# Patient Record
Sex: FEMALE | Race: WHITE | HISPANIC OR LATINO | URBAN - METROPOLITAN AREA
[De-identification: names, ages, dates, MRNs, and addresses within clinical notes are randomized per-mention and may not be internally consistent; named-entity substitution may affect disease eponyms.]

---

## 2017-02-27 PROBLEM — Z00.00 ENCOUNTER FOR PREVENTIVE HEALTH EXAMINATION: Status: ACTIVE | Noted: 2017-02-27

## 2017-03-03 ENCOUNTER — OUTPATIENT (OUTPATIENT)
Dept: OUTPATIENT SERVICES | Facility: HOSPITAL | Age: 47
LOS: 1 days | End: 2017-03-03
Payer: COMMERCIAL

## 2017-03-03 ENCOUNTER — APPOINTMENT (OUTPATIENT)
Dept: MRI IMAGING | Facility: CLINIC | Age: 47
End: 2017-03-03

## 2017-03-03 DIAGNOSIS — Z00.8 ENCOUNTER FOR OTHER GENERAL EXAMINATION: ICD-10-CM

## 2017-03-03 PROCEDURE — 70553 MRI BRAIN STEM W/O & W/DYE: CPT

## 2017-03-03 PROCEDURE — A9585: CPT

## 2017-03-29 ENCOUNTER — TRANSCRIPTION ENCOUNTER (OUTPATIENT)
Age: 47
End: 2017-03-29

## 2017-11-02 ENCOUNTER — OUTPATIENT (OUTPATIENT)
Dept: OUTPATIENT SERVICES | Facility: HOSPITAL | Age: 47
LOS: 1 days | End: 2017-11-02
Payer: COMMERCIAL

## 2017-11-02 ENCOUNTER — APPOINTMENT (OUTPATIENT)
Dept: MAMMOGRAPHY | Facility: CLINIC | Age: 47
End: 2017-11-02
Payer: COMMERCIAL

## 2017-11-02 ENCOUNTER — APPOINTMENT (OUTPATIENT)
Dept: ULTRASOUND IMAGING | Facility: CLINIC | Age: 47
End: 2017-11-02
Payer: COMMERCIAL

## 2017-11-02 DIAGNOSIS — Z00.8 ENCOUNTER FOR OTHER GENERAL EXAMINATION: ICD-10-CM

## 2017-11-02 PROCEDURE — 76641 ULTRASOUND BREAST COMPLETE: CPT

## 2017-11-02 PROCEDURE — 77067 SCR MAMMO BI INCL CAD: CPT

## 2017-11-02 PROCEDURE — 76641 ULTRASOUND BREAST COMPLETE: CPT | Mod: 26,50

## 2017-11-02 PROCEDURE — 77063 BREAST TOMOSYNTHESIS BI: CPT | Mod: 26

## 2017-11-02 PROCEDURE — G0202: CPT | Mod: 26

## 2017-11-02 PROCEDURE — 77063 BREAST TOMOSYNTHESIS BI: CPT

## 2018-11-27 ENCOUNTER — OUTPATIENT (OUTPATIENT)
Dept: OUTPATIENT SERVICES | Facility: HOSPITAL | Age: 48
LOS: 1 days | End: 2018-11-27
Payer: MEDICAID

## 2018-11-27 ENCOUNTER — APPOINTMENT (OUTPATIENT)
Dept: ULTRASOUND IMAGING | Facility: CLINIC | Age: 48
End: 2018-11-27
Payer: MEDICAID

## 2018-11-27 ENCOUNTER — APPOINTMENT (OUTPATIENT)
Dept: MAMMOGRAPHY | Facility: CLINIC | Age: 48
End: 2018-11-27
Payer: MEDICAID

## 2018-11-27 DIAGNOSIS — Z00.8 ENCOUNTER FOR OTHER GENERAL EXAMINATION: ICD-10-CM

## 2018-11-27 PROCEDURE — 77063 BREAST TOMOSYNTHESIS BI: CPT | Mod: 26

## 2018-11-27 PROCEDURE — 77067 SCR MAMMO BI INCL CAD: CPT

## 2018-11-27 PROCEDURE — 77063 BREAST TOMOSYNTHESIS BI: CPT

## 2018-11-27 PROCEDURE — 76641 ULTRASOUND BREAST COMPLETE: CPT | Mod: 26,50

## 2018-11-27 PROCEDURE — 77067 SCR MAMMO BI INCL CAD: CPT | Mod: 26

## 2018-11-27 PROCEDURE — 76641 ULTRASOUND BREAST COMPLETE: CPT

## 2018-12-21 ENCOUNTER — APPOINTMENT (OUTPATIENT)
Dept: UROGYNECOLOGY | Facility: CLINIC | Age: 48
End: 2018-12-21
Payer: MEDICAID

## 2018-12-21 VITALS
BODY MASS INDEX: 23.79 KG/M2 | SYSTOLIC BLOOD PRESSURE: 110 MMHG | HEIGHT: 61 IN | WEIGHT: 126 LBS | DIASTOLIC BLOOD PRESSURE: 64 MMHG

## 2018-12-21 DIAGNOSIS — Z87.891 PERSONAL HISTORY OF NICOTINE DEPENDENCE: ICD-10-CM

## 2018-12-21 DIAGNOSIS — E07.9 DISORDER OF THYROID, UNSPECIFIED: ICD-10-CM

## 2018-12-21 DIAGNOSIS — R35.1 NOCTURIA: ICD-10-CM

## 2018-12-21 DIAGNOSIS — Z78.9 OTHER SPECIFIED HEALTH STATUS: ICD-10-CM

## 2018-12-21 LAB
BILIRUB UR QL STRIP: NEGATIVE
CLARITY UR: CLEAR
COLLECTION METHOD: NORMAL
GLUCOSE UR-MCNC: NEGATIVE
HCG UR QL: 0.2 EU/DL
HGB UR QL STRIP.AUTO: NEGATIVE
KETONES UR-MCNC: NEGATIVE
LEUKOCYTE ESTERASE UR QL STRIP: NEGATIVE
NITRITE UR QL STRIP: NEGATIVE
PH UR STRIP: 7
PROT UR STRIP-MCNC: NEGATIVE
SP GR UR STRIP: 1.01

## 2018-12-21 PROCEDURE — 81003 URINALYSIS AUTO W/O SCOPE: CPT | Mod: QW

## 2018-12-21 PROCEDURE — 99204 OFFICE O/P NEW MOD 45 MIN: CPT | Mod: 25

## 2018-12-21 PROCEDURE — 51701 INSERT BLADDER CATHETER: CPT

## 2018-12-21 RX ORDER — LEVOTHYROXINE SODIUM 137 UG/1
137 TABLET ORAL
Refills: 0 | Status: ACTIVE | COMMUNITY

## 2019-01-09 ENCOUNTER — APPOINTMENT (OUTPATIENT)
Dept: UROGYNECOLOGY | Facility: CLINIC | Age: 49
End: 2019-01-09
Payer: MEDICAID

## 2019-01-09 PROCEDURE — 51729 CYSTOMETROGRAM W/VP&UP: CPT

## 2019-01-09 PROCEDURE — 51741 ELECTRO-UROFLOWMETRY FIRST: CPT

## 2019-01-09 PROCEDURE — 51784 ANAL/URINARY MUSCLE STUDY: CPT

## 2019-01-09 PROCEDURE — 51797 INTRAABDOMINAL PRESSURE TEST: CPT

## 2019-02-28 ENCOUNTER — APPOINTMENT (OUTPATIENT)
Dept: UROGYNECOLOGY | Facility: CLINIC | Age: 49
End: 2019-02-28
Payer: MEDICAID

## 2019-02-28 PROCEDURE — 51798 US URINE CAPACITY MEASURE: CPT

## 2019-02-28 PROCEDURE — 99213 OFFICE O/P EST LOW 20 MIN: CPT

## 2019-02-28 NOTE — DISCUSSION/SUMMARY
[FreeTextEntry1] : \elena Lezama presents for followup, UDS demonstrates MARBELLA at capacity. She reports bothersome MARBELLA. We again discussed management options and included doing nothing, behavioral modification, Kegel's exercises with and without biofeedback, medications, a pessary or intravaginal devices, periurethral bulking, and surgical correction with a suburethral sling v Boyce v autologous sling. She would like to start with physical therapy and return in 2 months for possible surgical discussion at that time. IUGA handout on MARBELLA given to her, I was able to answer all her questions. \par \par [] PT\par [] return in 2 months, possible surgical discussion \par

## 2019-02-28 NOTE — HISTORY OF PRESENT ILLNESS
[FreeTextEntry1] : \par Lise presents for followup, she has MARBELLA and has tried PFE with bladder training with small improvement in her symptoms. She denies incomplete emptying.\par  UDS demonstrated capacity of 243cc with MARBELLA at capacity without the catheter. \par \par PVR by bladder scan 3cc

## 2019-05-16 ENCOUNTER — APPOINTMENT (OUTPATIENT)
Age: 49
End: 2019-05-16

## 2019-10-10 ENCOUNTER — APPOINTMENT (OUTPATIENT)
Dept: UROGYNECOLOGY | Facility: CLINIC | Age: 49
End: 2019-10-10
Payer: MEDICAID

## 2019-10-10 VITALS
DIASTOLIC BLOOD PRESSURE: 70 MMHG | WEIGHT: 126 LBS | BODY MASS INDEX: 23.79 KG/M2 | SYSTOLIC BLOOD PRESSURE: 104 MMHG | HEIGHT: 61 IN

## 2019-10-10 PROCEDURE — 99213 OFFICE O/P EST LOW 20 MIN: CPT

## 2019-10-10 NOTE — DISCUSSION/SUMMARY
[FreeTextEntry1] : \par Lise presents for followup, UDS demonstrates MARBELLA at capacity. She reports bothersome MARBELLA.  We again discussed options and she is interested in midurethral sling. We discussed chance of going home with a kramer and postoperative recovery. All questions were answered.\par \par [] booking for sling/cystoscopy

## 2019-10-10 NOTE — HISTORY OF PRESENT ILLNESS
[FreeTextEntry1] : \par Lise presents for followup, she has MARBELLA and has tried PFE with bladder training with small improvement in her symptoms. She denies incomplete emptying. She did not try PT and now returns with increasing symptoms of stress incontinence and would like to discuss midurethral sling.\par \par  UDS demonstrated capacity of 243cc with MARBELLA at capacity without the catheter. \par \par

## 2019-10-10 NOTE — PHYSICAL EXAM
[Labia Majora] : were normal [Labia Minora] : were normal [Normal Appearance] : general appearance was normal [Normal] : no abnormalities [de-identified] : no prolapse

## 2019-11-11 ENCOUNTER — APPOINTMENT (OUTPATIENT)
Dept: UROGYNECOLOGY | Facility: CLINIC | Age: 49
End: 2019-11-11
Payer: MEDICAID

## 2019-11-11 PROCEDURE — 99214 OFFICE O/P EST MOD 30 MIN: CPT

## 2019-11-11 NOTE — HISTORY OF PRESENT ILLNESS
[FreeTextEntry1] : \elena Lezama presents for f/u on MARBELLA, she has MARBELLA on UDS and worsening complaints including leakage with running, she desires definitive surgical management

## 2019-11-11 NOTE — PHYSICAL EXAM
[No Acute Distress] : in no acute distress [Well Nourished] : ~L well nourished [Well developed] : well developed [Oriented x3] : oriented to person, place, and time [Normal Mood/Affect] : mood and affect are normal [Normal Memory] : ~T memory was ~L unimpaired

## 2019-11-11 NOTE — DISCUSSION/SUMMARY
[FreeTextEntry1] : \elena Lezama presents with MARBELLA. The patient presented to the office today for counseling regarding her decision for surgical treatment of her urinary incontinence. All pertinent prior studies including urodynamic testing were reviewed. Discussed not meant to treat OAB symptoms. The patient was counseled regarding alternative non-surgical therapies as well as the prognosis with no intervention.The patient was advised regarding various surgical options including abdominal, laparoscopic, transurethral and vaginal approaches, allograft (harvesting one’s own tissue), xenograft (using tissue from donor) and synthetic grafts.  The risks and benefits of surgery using graft insertion (mesh) were fully reviewed.  She was informed of the potential for improved durability and the inherent risk of mesh use including but not limited to infection, erosion and chronic inflammation, acute and chronic pain, pain with intercourse (both of which may be refractory to treatment) fistula, disturbance in bladder function, any of which may require additional surgery for mesh revision. Discussed risk of allergic reaction to mesh.  She is aware that the mesh used in her surgery is a permanent mesh.  The patient was advised regarding the July 2011 FDA notification regarding these issues and provided the website address for further reference www.fda.gov.  The general risks of the surgery were reviewed including, but not limited to infection, bleeding, including transfusion, surrounding organ or tissue injury (bladder, rectum, bowel, urethra, ureters, nerves vessels or muscles), failure meaning continued leaking, voiding dysfunction, needing to go home with a catheter, pain with sex, blood clots, and anesthesia.The approximate length of the surgery, hospital stay and postoperative recovery period were reviewed, including a general overview of convalescence and postoperative follow-up.The patient verbalized a desire to proceed with the surgery.  Appropriate informed consent was obtained.  All questions were answered to the patient’s satisfaction. IUGA on MARBELLA given to her. \par \par [] sling/cysto, any other indicated procedures\par

## 2019-12-13 ENCOUNTER — OUTPATIENT (OUTPATIENT)
Dept: OUTPATIENT SERVICES | Facility: HOSPITAL | Age: 49
LOS: 1 days | End: 2019-12-13
Payer: MEDICAID

## 2019-12-13 DIAGNOSIS — Z01.818 ENCOUNTER FOR OTHER PREPROCEDURAL EXAMINATION: ICD-10-CM

## 2019-12-13 LAB
ALBUMIN SERPL ELPH-MCNC: 4.8 G/DL — SIGNIFICANT CHANGE UP (ref 3.3–5.2)
ALP SERPL-CCNC: 68 U/L — SIGNIFICANT CHANGE UP (ref 40–120)
ALT FLD-CCNC: 23 U/L — SIGNIFICANT CHANGE UP
ANION GAP SERPL CALC-SCNC: 12 MMOL/L — SIGNIFICANT CHANGE UP (ref 5–17)
APPEARANCE UR: CLEAR — SIGNIFICANT CHANGE UP
APTT BLD: 32.9 SEC — SIGNIFICANT CHANGE UP (ref 27.5–36.3)
AST SERPL-CCNC: 26 U/L — SIGNIFICANT CHANGE UP
BASOPHILS # BLD AUTO: 0.04 K/UL — SIGNIFICANT CHANGE UP (ref 0–0.2)
BASOPHILS NFR BLD AUTO: 1 % — SIGNIFICANT CHANGE UP (ref 0–2)
BILIRUB SERPL-MCNC: 0.4 MG/DL — SIGNIFICANT CHANGE UP (ref 0.4–2)
BILIRUB UR-MCNC: NEGATIVE — SIGNIFICANT CHANGE UP
BUN SERPL-MCNC: 25 MG/DL — HIGH (ref 8–20)
CALCIUM SERPL-MCNC: 9.8 MG/DL — SIGNIFICANT CHANGE UP (ref 8.6–10.2)
CHLORIDE SERPL-SCNC: 101 MMOL/L — SIGNIFICANT CHANGE UP (ref 98–107)
CO2 SERPL-SCNC: 25 MMOL/L — SIGNIFICANT CHANGE UP (ref 22–29)
COLOR SPEC: YELLOW — SIGNIFICANT CHANGE UP
CREAT SERPL-MCNC: 0.71 MG/DL — SIGNIFICANT CHANGE UP (ref 0.5–1.3)
DIFF PNL FLD: NEGATIVE — SIGNIFICANT CHANGE UP
EOSINOPHIL # BLD AUTO: 0.06 K/UL — SIGNIFICANT CHANGE UP (ref 0–0.5)
EOSINOPHIL NFR BLD AUTO: 1.5 % — SIGNIFICANT CHANGE UP (ref 0–6)
GLUCOSE SERPL-MCNC: 87 MG/DL — SIGNIFICANT CHANGE UP (ref 70–115)
GLUCOSE UR QL: NEGATIVE MG/DL — SIGNIFICANT CHANGE UP
HCG UR QL: NEGATIVE — SIGNIFICANT CHANGE UP
HCT VFR BLD CALC: 40.2 % — SIGNIFICANT CHANGE UP (ref 34.5–45)
HGB BLD-MCNC: 12.8 G/DL — SIGNIFICANT CHANGE UP (ref 11.5–15.5)
IMM GRANULOCYTES NFR BLD AUTO: 0.2 % — SIGNIFICANT CHANGE UP (ref 0–1.5)
INR BLD: 1.03 RATIO — SIGNIFICANT CHANGE UP (ref 0.88–1.16)
KETONES UR-MCNC: NEGATIVE — SIGNIFICANT CHANGE UP
LEUKOCYTE ESTERASE UR-ACNC: NEGATIVE — SIGNIFICANT CHANGE UP
LYMPHOCYTES # BLD AUTO: 1.7 K/UL — SIGNIFICANT CHANGE UP (ref 1–3.3)
LYMPHOCYTES # BLD AUTO: 42.3 % — SIGNIFICANT CHANGE UP (ref 13–44)
MCHC RBC-ENTMCNC: 28.3 PG — SIGNIFICANT CHANGE UP (ref 27–34)
MCHC RBC-ENTMCNC: 31.8 GM/DL — LOW (ref 32–36)
MCV RBC AUTO: 88.7 FL — SIGNIFICANT CHANGE UP (ref 80–100)
MONOCYTES # BLD AUTO: 0.37 K/UL — SIGNIFICANT CHANGE UP (ref 0–0.9)
MONOCYTES NFR BLD AUTO: 9.2 % — SIGNIFICANT CHANGE UP (ref 2–14)
NEUTROPHILS # BLD AUTO: 1.84 K/UL — SIGNIFICANT CHANGE UP (ref 1.8–7.4)
NEUTROPHILS NFR BLD AUTO: 45.8 % — SIGNIFICANT CHANGE UP (ref 43–77)
NITRITE UR-MCNC: NEGATIVE — SIGNIFICANT CHANGE UP
PH UR: 8 — SIGNIFICANT CHANGE UP (ref 5–8)
PLATELET # BLD AUTO: 241 K/UL — SIGNIFICANT CHANGE UP (ref 150–400)
POTASSIUM SERPL-MCNC: 4.5 MMOL/L — SIGNIFICANT CHANGE UP (ref 3.5–5.3)
POTASSIUM SERPL-SCNC: 4.5 MMOL/L — SIGNIFICANT CHANGE UP (ref 3.5–5.3)
PROT SERPL-MCNC: 7.2 G/DL — SIGNIFICANT CHANGE UP (ref 6.6–8.7)
PROT UR-MCNC: NEGATIVE MG/DL — SIGNIFICANT CHANGE UP
PROTHROM AB SERPL-ACNC: 11.8 SEC — SIGNIFICANT CHANGE UP (ref 10–12.9)
RBC # BLD: 4.53 M/UL — SIGNIFICANT CHANGE UP (ref 3.8–5.2)
RBC # FLD: 12.8 % — SIGNIFICANT CHANGE UP (ref 10.3–14.5)
SODIUM SERPL-SCNC: 138 MMOL/L — SIGNIFICANT CHANGE UP (ref 135–145)
SP GR SPEC: 1.01 — SIGNIFICANT CHANGE UP (ref 1.01–1.02)
UROBILINOGEN FLD QL: NEGATIVE MG/DL — SIGNIFICANT CHANGE UP
WBC # BLD: 4.02 K/UL — SIGNIFICANT CHANGE UP (ref 3.8–10.5)
WBC # FLD AUTO: 4.02 K/UL — SIGNIFICANT CHANGE UP (ref 3.8–10.5)

## 2019-12-13 PROCEDURE — 81025 URINE PREGNANCY TEST: CPT

## 2019-12-13 PROCEDURE — 87086 URINE CULTURE/COLONY COUNT: CPT

## 2019-12-13 PROCEDURE — G0463: CPT

## 2019-12-13 PROCEDURE — 80053 COMPREHEN METABOLIC PANEL: CPT

## 2019-12-13 PROCEDURE — 85027 COMPLETE CBC AUTOMATED: CPT

## 2019-12-13 PROCEDURE — 85610 PROTHROMBIN TIME: CPT

## 2019-12-13 PROCEDURE — 85730 THROMBOPLASTIN TIME PARTIAL: CPT

## 2019-12-13 PROCEDURE — 36415 COLL VENOUS BLD VENIPUNCTURE: CPT

## 2019-12-13 PROCEDURE — 81003 URINALYSIS AUTO W/O SCOPE: CPT

## 2019-12-14 LAB
CULTURE RESULTS: NO GROWTH — SIGNIFICANT CHANGE UP
SPECIMEN SOURCE: SIGNIFICANT CHANGE UP

## 2019-12-23 ENCOUNTER — APPOINTMENT (OUTPATIENT)
Dept: UROGYNECOLOGY | Facility: AMBULATORY SURGERY CENTER | Age: 49
End: 2019-12-23
Payer: MEDICAID

## 2019-12-23 PROCEDURE — 57288 REPAIR BLADDER DEFECT: CPT

## 2019-12-23 RX ORDER — OXYCODONE AND ACETAMINOPHEN 5; 325 MG/1; MG/1
5-325 TABLET ORAL
Qty: 15 | Refills: 0 | Status: ACTIVE | COMMUNITY
Start: 2019-12-23 | End: 1900-01-01

## 2019-12-30 ENCOUNTER — APPOINTMENT (OUTPATIENT)
Age: 49
End: 2019-12-30
Payer: MEDICAID

## 2019-12-30 VITALS
DIASTOLIC BLOOD PRESSURE: 50 MMHG | SYSTOLIC BLOOD PRESSURE: 98 MMHG | HEIGHT: 61 IN | BODY MASS INDEX: 23.67 KG/M2 | WEIGHT: 125.38 LBS

## 2019-12-30 PROCEDURE — 99024 POSTOP FOLLOW-UP VISIT: CPT

## 2019-12-30 NOTE — SUBJECTIVE
[FreeTextEntry1] : feels well, however felt increase in incontinence a few days after surgery [FreeTextEntry7] : minimal [FreeTextEntry5] : unsure if leakage, slower stream but feels empty [FreeTextEntry6] : normal

## 2019-12-30 NOTE — DISCUSSION/SUMMARY
[Post-Op instructions given. Pt/family verbalizes understanding] : post-operative instructions were provided to the patient/family who verbalize understanding [Risks/Benefits discussed. Pt/family verbalizes understanding] : risks and benefits of the procedure were discussed with the patient/family who verbalize understanding [FreeTextEntry1] : \par 1 wk s/p sling, cystoscopy\par was having incontinence, however now improved\par pvr normal, exam normal\par reviewed precautions\par return in 5 wks for postop check

## 2019-12-30 NOTE — OBJECTIVE
[Voiding Trial] : No voiding trial was performed [Post Void Residual ____ ml] : Post Void Residual was [unfilled] ml [Soft and Nontender] : soft and nontender [Clean, Dry, Intact] : Clean, Dry, Intact [Good Support] : Good support [Healing well] : healing well [No Masses or Tenderness] : no masses or tenderness [FreeTextEntry3] : + vicryl sutures

## 2019-12-31 ENCOUNTER — APPOINTMENT (OUTPATIENT)
Dept: UROGYNECOLOGY | Facility: CLINIC | Age: 49
End: 2019-12-31

## 2020-01-09 ENCOUNTER — APPOINTMENT (OUTPATIENT)
Dept: UROGYNECOLOGY | Facility: CLINIC | Age: 50
End: 2020-01-09
Payer: MEDICAID

## 2020-01-09 ENCOUNTER — APPOINTMENT (OUTPATIENT)
Dept: UROGYNECOLOGY | Facility: CLINIC | Age: 50
End: 2020-01-09

## 2020-01-09 ENCOUNTER — RESULT CHARGE (OUTPATIENT)
Age: 50
End: 2020-01-09

## 2020-01-09 VITALS
HEIGHT: 61 IN | BODY MASS INDEX: 23.6 KG/M2 | WEIGHT: 125 LBS | SYSTOLIC BLOOD PRESSURE: 99 MMHG | DIASTOLIC BLOOD PRESSURE: 65 MMHG

## 2020-01-09 LAB
BILIRUB UR QL STRIP: NEGATIVE
CLARITY UR: CLEAR
COLLECTION METHOD: NORMAL
GLUCOSE UR-MCNC: NEGATIVE
HCG UR QL: 0.2 EU/DL
HGB UR QL STRIP.AUTO: NEGATIVE
KETONES UR-MCNC: NEGATIVE
LEUKOCYTE ESTERASE UR QL STRIP: NORMAL
NITRITE UR QL STRIP: NEGATIVE
PH UR STRIP: 6
PROT UR STRIP-MCNC: NEGATIVE
SP GR UR STRIP: 1

## 2020-01-09 PROCEDURE — 81003 URINALYSIS AUTO W/O SCOPE: CPT | Mod: QW

## 2020-01-09 PROCEDURE — 99024 POSTOP FOLLOW-UP VISIT: CPT

## 2020-01-09 NOTE — SUBJECTIVE
[FreeTextEntry1] : feels well, but feels urgency, frequency, and UI [FreeTextEntry7] : minimal [FreeTextEntry6] : normal [FreeTextEntry5] : denies incomplete emptying, no MARBELLA, but OAB symptoms

## 2020-01-09 NOTE — DISCUSSION/SUMMARY
[Post-Op instructions given. Pt/family verbalizes understanding] : post-operative instructions were provided to the patient/family who verbalize understanding [Risks/Benefits discussed. Pt/family verbalizes understanding] : risks and benefits of the procedure were discussed with the patient/family who verbalize understanding [FreeTextEntry1] : \par 2 wks s/p sling, cystoscopy\par OAB symptoms, normal PVR, exam normal\par discussed bladder training, trial of myrbetriq, vs sling release, will do BT and trial of myrbetriq 50 (samples given)\par reviewed precautions\par return in 4 wks for postop check

## 2020-01-30 ENCOUNTER — APPOINTMENT (OUTPATIENT)
Age: 50
End: 2020-01-30
Payer: MEDICAID

## 2020-01-30 ENCOUNTER — RESULT CHARGE (OUTPATIENT)
Age: 50
End: 2020-01-30

## 2020-01-30 VITALS
BODY MASS INDEX: 24.17 KG/M2 | DIASTOLIC BLOOD PRESSURE: 69 MMHG | SYSTOLIC BLOOD PRESSURE: 108 MMHG | WEIGHT: 128 LBS | TEMPERATURE: 98.1 F | HEIGHT: 61 IN

## 2020-01-30 DIAGNOSIS — Z98.890 OTHER SPECIFIED POSTPROCEDURAL STATES: ICD-10-CM

## 2020-01-30 LAB
BILIRUB UR QL STRIP: NEGATIVE
CLARITY UR: CLEAR
COLLECTION METHOD: NORMAL
GLUCOSE UR-MCNC: NEGATIVE
HCG UR QL: 0.2 EU/DL
HGB UR QL STRIP.AUTO: NORMAL
KETONES UR-MCNC: NEGATIVE
LEUKOCYTE ESTERASE UR QL STRIP: NORMAL
NITRITE UR QL STRIP: NEGATIVE
PH UR STRIP: 6
PROT UR STRIP-MCNC: NEGATIVE
SP GR UR STRIP: 1.01

## 2020-01-30 PROCEDURE — 99024 POSTOP FOLLOW-UP VISIT: CPT

## 2020-01-30 RX ORDER — KETOCONAZOLE 20 MG/G
2 CREAM TOPICAL
Qty: 60 | Refills: 0 | Status: ACTIVE | COMMUNITY
Start: 2019-10-07

## 2020-01-30 RX ORDER — LIOTHYRONINE SODIUM 5 UG/1
5 TABLET ORAL
Qty: 180 | Refills: 0 | Status: ACTIVE | COMMUNITY
Start: 2019-12-12

## 2020-01-30 NOTE — DISCUSSION/SUMMARY
[FreeTextEntry1] : \elena Lezama presents for followup she is 6 wks s/p midurethral sling with OAB symptoms now improving with resolution of MARBELLA and normal PVR. Discussed PT, and will return in 2-3 months. All questions were answered.

## 2020-01-30 NOTE — HISTORY OF PRESENT ILLNESS
[FreeTextEntry1] : \elena Lezama is 6 wks s/p midurethral sling, initially with OAB symptoms now 40-50 percent improved, did not take myrbetriq , has been doing behavorial modifications, resolution of MARBELLA

## 2020-01-30 NOTE — PHYSICAL EXAM
[Well Nourished] : ~L well nourished [No Acute Distress] : in no acute distress [Well developed] : well developed [Normal Memory] : ~T memory was ~L unimpaired [Oriented x3] : oriented to person, place, and time [Tenderness] : ~T no ~M abdominal tenderness observed [Normal Mood/Affect] : mood and affect are normal [Distended] : not distended [FreeTextEntry4] : no mesh exposure

## 2020-03-26 ENCOUNTER — APPOINTMENT (OUTPATIENT)
Dept: UROGYNECOLOGY | Facility: CLINIC | Age: 50
End: 2020-03-26

## 2020-07-13 ENCOUNTER — APPOINTMENT (OUTPATIENT)
Dept: UROGYNECOLOGY | Facility: CLINIC | Age: 50
End: 2020-07-13
Payer: MEDICAID

## 2020-07-13 VITALS
BODY MASS INDEX: 24.17 KG/M2 | HEIGHT: 61 IN | SYSTOLIC BLOOD PRESSURE: 114 MMHG | WEIGHT: 128 LBS | DIASTOLIC BLOOD PRESSURE: 80 MMHG

## 2020-07-13 DIAGNOSIS — R35.0 FREQUENCY OF MICTURITION: ICD-10-CM

## 2020-07-13 LAB
BILIRUB UR QL STRIP: NEGATIVE
CLARITY UR: CLEAR
COLLECTION METHOD: NORMAL
GLUCOSE UR-MCNC: NEGATIVE
HCG UR QL: 0.2 EU/DL
HGB UR QL STRIP.AUTO: NEGATIVE
KETONES UR-MCNC: NEGATIVE
LEUKOCYTE ESTERASE UR QL STRIP: NEGATIVE
NITRITE UR QL STRIP: NEGATIVE
PH UR STRIP: 6.5
PROT UR STRIP-MCNC: NEGATIVE
SP GR UR STRIP: 1.02

## 2020-07-13 PROCEDURE — 99214 OFFICE O/P EST MOD 30 MIN: CPT

## 2020-07-13 PROCEDURE — 81003 URINALYSIS AUTO W/O SCOPE: CPT | Mod: QW

## 2020-07-13 NOTE — DISCUSSION/SUMMARY
[FreeTextEntry1] : \elena Lezama presents for followup she is s/p midurethral sling with exacerbation of OAB symptoms, discussed UDS/cysto to further evaluate symptoms, discussed continuing PT, she is able to empty her bladder, discussed continuing BT, she does not want to try medications at this time. All questions were answered.

## 2020-07-13 NOTE — PHYSICAL EXAM
[No Acute Distress] : in no acute distress [Well developed] : well developed [Well Nourished] : ~L well nourished [Oriented x3] : oriented to person, place, and time [Normal Mood/Affect] : mood and affect are normal [Normal Memory] : ~T memory was ~L unimpaired [Tenderness] : ~T no ~M abdominal tenderness observed [Distended] : not distended [Labia Minora] : were normal [Labia Majora] : were normal [Normal Appearance] : general appearance was normal [2] : 2 [Normal] : normal [Uterine Adnexae] : were not tender and not enlarged [FreeTextEntry4] : no mesh exposure

## 2020-07-13 NOTE — HISTORY OF PRESENT ILLNESS
[Cystocele (Obstetric)] : no [Vaginal Wall Prolapse] : no [Rectal Prolapse] : no [Urge Incontinence Of Urine] : no [Unable To Restrain Bowel Movement] : moderate [Feelings Of Urinary Urgency] : no [Urinary Frequency] : no [x1] : nocturia once nightly [Urinary Tract Infection] : moderate [Constipation Obstructed Defecation] : no [Pelvic Pain] : no [Vaginal Pain] : no [Vulvar Pain] : no [] : mild [FreeTextEntry1] : \par \par Lise is s/p midurethral sling with OAB symptoms, she did have urgency prior to sling and now has UUI, rare MARBELLA if strong sneeze however able to lift heavy things, she stopped PT due to COVID and has just restarted, she had an episode with a large sneeze, leakage, and LLQ pain after that now is improving, she does not want to try medications, she has nocturia X 1, occasional dyspareunia deep depending on position, denies incomplete emptying

## 2020-07-18 ENCOUNTER — TRANSCRIPTION ENCOUNTER (OUTPATIENT)
Age: 50
End: 2020-07-18

## 2020-07-22 ENCOUNTER — APPOINTMENT (OUTPATIENT)
Dept: ORTHOPEDIC SURGERY | Facility: CLINIC | Age: 50
End: 2020-07-22
Payer: MEDICAID

## 2020-07-22 PROCEDURE — 26600 TREAT METACARPAL FRACTURE: CPT | Mod: RT

## 2020-07-22 PROCEDURE — 99204 OFFICE O/P NEW MOD 45 MIN: CPT | Mod: 57

## 2020-07-22 NOTE — HISTORY OF PRESENT ILLNESS
[FreeTextEntry1] : 07/22/2020: 50-year-old female presents for evaluation of a right fifth metacarpal fracture. Approximately 2 weeks ago she struck her hand on a hard surface and developed pain at the base of the fifth metacarpal. She was recently seen at an urgent care where x-rays were positive for a minimally displaced fracture of the fifth metacarpal. She was given a splint and presents in followup. She has no pain at the fracture site that does not radiate it is worse with activity and improved with immobilization.

## 2020-07-22 NOTE — PHYSICAL EXAM
[Normal Finger/nose] : finger to nose coordination [Normal] : no peripheral adenopathy appreciated [de-identified] : right hand:\par Skin intact mild swelling no ecchymosis no erythema\par Mild prominence at the fracture site, normal finger cascade\par Range of motion of the digits slightly limited secondary to stiffness\par Neurovascular intact distally [de-identified] : SENAR [de-identified] : 3 xray views of the right hand were reviewed from an outside institution. There is a minimally displaced transverse fracture to the base of the 5th metacarpal with no dorsal angulation.

## 2020-07-22 NOTE — ASSESSMENT
[FreeTextEntry1] : 50-year-old female 2 weeks status post minimally displaced fracture of the base of the right fifth metacarpal. She was placed in a short arm or upper cast today which she will keep that intact.Followup in 3 weeks for cast removal and repeat x-rays.

## 2020-08-10 ENCOUNTER — APPOINTMENT (OUTPATIENT)
Dept: UROGYNECOLOGY | Facility: CLINIC | Age: 50
End: 2020-08-10
Payer: MEDICAID

## 2020-08-10 DIAGNOSIS — N95.0 POSTMENOPAUSAL BLEEDING: ICD-10-CM

## 2020-08-10 LAB
BILIRUB UR QL STRIP: NEGATIVE
CLARITY UR: CLEAR
COLLECTION METHOD: NORMAL
GLUCOSE UR-MCNC: NEGATIVE
HCG UR QL: 0.2 EU/DL
HGB UR QL STRIP.AUTO: NEGATIVE
KETONES UR-MCNC: NEGATIVE
LEUKOCYTE ESTERASE UR QL STRIP: NEGATIVE
NITRITE UR QL STRIP: NEGATIVE
PH UR STRIP: 7.5
PROT UR STRIP-MCNC: NEGATIVE
SP GR UR STRIP: 1.02

## 2020-08-10 PROCEDURE — 81003 URINALYSIS AUTO W/O SCOPE: CPT | Mod: QW

## 2020-08-10 PROCEDURE — 52000 CYSTOURETHROSCOPY: CPT

## 2020-08-12 ENCOUNTER — APPOINTMENT (OUTPATIENT)
Dept: ORTHOPEDIC SURGERY | Facility: CLINIC | Age: 50
End: 2020-08-12
Payer: MEDICAID

## 2020-08-12 DIAGNOSIS — Z78.9 OTHER SPECIFIED HEALTH STATUS: ICD-10-CM

## 2020-08-12 PROCEDURE — 99024 POSTOP FOLLOW-UP VISIT: CPT

## 2020-08-12 PROCEDURE — 73130 X-RAY EXAM OF HAND: CPT | Mod: RT

## 2020-08-12 NOTE — PHYSICAL EXAM
[Normal Finger/nose] : finger to nose coordination [Normal] : no peripheral adenopathy appreciated [de-identified] : right hand:\par Skin intact no swelling no ecchymosis no erythema\par no gross deformity, normal finger cascade, no pain at the fracture site with palpation\par Range of motion of the digits slightly limited secondary to stiffness\par Neurovascular intact distally [de-identified] : SENAR [de-identified] : 3 xray views of the right hand were obtained, there is a nondisplaced fracture at the base of the fifth metacarpal with interval callus formation

## 2020-08-12 NOTE — HISTORY OF PRESENT ILLNESS
[FreeTextEntry1] : 07/22/2020: 50-year-old female presents for evaluation of a right fifth metacarpal fracture. Approximately 2 weeks ago she struck her hand on a hard surface and developed pain at the base of the fifth metacarpal. She was recently seen at an urgent care where x-rays were positive for a minimally displaced fracture of the fifth metacarpal. She was given a splint and presents in followup. She has no pain at the fracture site that does not radiate it is worse with activity and improved with immobilization.\par \par 08/12/2020: Patient returns for repeat xrays and re-evaluation of her right hand.  he has tolerated her cast and has kept it clean dry and intact. She has no pain at the fracture site.

## 2020-08-12 NOTE — ASSESSMENT
[FreeTextEntry1] : 50-year-old female 5 weeks status post closed treatment of aright fifth metacarpal base fracture. She is healing well clinically and radiographically. I recommend use of a wrist immobilizer for activity she may remove it for light activity as well as bathing. Followup weeks for x-rays and evaluation.

## 2020-08-20 ENCOUNTER — APPOINTMENT (OUTPATIENT)
Dept: UROGYNECOLOGY | Facility: CLINIC | Age: 50
End: 2020-08-20
Payer: MEDICAID

## 2020-08-20 PROCEDURE — 51797 INTRAABDOMINAL PRESSURE TEST: CPT

## 2020-08-20 PROCEDURE — 51741 ELECTRO-UROFLOWMETRY FIRST: CPT

## 2020-08-20 PROCEDURE — 51784 ANAL/URINARY MUSCLE STUDY: CPT

## 2020-08-20 PROCEDURE — 51729 CYSTOMETROGRAM W/VP&UP: CPT

## 2020-09-09 ENCOUNTER — APPOINTMENT (OUTPATIENT)
Dept: ORTHOPEDIC SURGERY | Facility: CLINIC | Age: 50
End: 2020-09-09
Payer: MEDICAID

## 2020-09-09 PROCEDURE — 99024 POSTOP FOLLOW-UP VISIT: CPT

## 2020-09-09 PROCEDURE — 73130 X-RAY EXAM OF HAND: CPT | Mod: RT

## 2020-09-09 NOTE — HISTORY OF PRESENT ILLNESS
[FreeTextEntry1] : 07/22/2020: 50-year-old female presents for evaluation of a right fifth metacarpal fracture. Approximately 2 weeks ago she struck her hand on a hard surface and developed pain at the base of the fifth metacarpal. She was recently seen at an urgent care where x-rays were positive for a minimally displaced fracture of the fifth metacarpal. She was given a splint and presents in followup. She has no pain at the fracture site that does not radiate it is worse with activity and improved with immobilization.\par \par 08/12/2020: Patient returns for repeat xrays and re-evaluation of her right hand.  he has tolerated her cast and has kept it clean dry and intact. She has no pain at the fracture site.\par \par 9/9/20: the patient returns today for followup of her right fifth metacarpal base fracture.She has been weaning from the splint and has improvement in her pain. She does have some stiffness of her digitsas well as activity-related pain at the fracture site that is within normal limits the

## 2020-09-09 NOTE — ASSESSMENT
[FreeTextEntry1] : patient is 2 months status post closed treatment of a right fifth metacarpal base fracture healing well. I recommenddiscontinuation of the brace and a course of hand therapy for range of motion and strengthening. Followup in 4 weeks for reevaluation

## 2020-09-09 NOTE — PHYSICAL EXAM
[Normal Finger/nose] : finger to nose coordination [Normal] : no peripheral adenopathy appreciated [de-identified] : right hand:\par Skin intact no swelling no ecchymosis no erythema\par no gross deformity, normal finger cascade, no pain at the fracture site with palpation\par Range of motion of the digits slightly limited secondary to stiffness\par Neurovascular intact distally [de-identified] : SENAR [de-identified] : 3 xray views of the right hand were obtained, there is a nondisplaced fracture at the base of the fifth metacarpal with interval callus formation

## 2020-09-24 ENCOUNTER — APPOINTMENT (OUTPATIENT)
Age: 50
End: 2020-09-24
Payer: MEDICAID

## 2020-09-24 ENCOUNTER — RESULT CHARGE (OUTPATIENT)
Age: 50
End: 2020-09-24

## 2020-09-24 VITALS
DIASTOLIC BLOOD PRESSURE: 70 MMHG | HEIGHT: 61 IN | SYSTOLIC BLOOD PRESSURE: 101 MMHG | BODY MASS INDEX: 24.17 KG/M2 | WEIGHT: 128 LBS

## 2020-09-24 DIAGNOSIS — N39.46 MIXED INCONTINENCE: ICD-10-CM

## 2020-09-24 DIAGNOSIS — N39.3 STRESS INCONTINENCE (FEMALE) (MALE): ICD-10-CM

## 2020-09-24 LAB
BILIRUB UR QL STRIP: NEGATIVE
CLARITY UR: CLEAR
COLLECTION METHOD: NORMAL
GLUCOSE UR-MCNC: NEGATIVE
HCG UR QL: 0.2 EU/DL
HGB UR QL STRIP.AUTO: NEGATIVE
KETONES UR-MCNC: NEGATIVE
LEUKOCYTE ESTERASE UR QL STRIP: NORMAL
NITRITE UR QL STRIP: NEGATIVE
PH UR STRIP: 7
PROT UR STRIP-MCNC: NEGATIVE
SP GR UR STRIP: 1.02

## 2020-09-24 PROCEDURE — 99213 OFFICE O/P EST LOW 20 MIN: CPT

## 2020-09-24 NOTE — HISTORY OF PRESENT ILLNESS
[Cystocele (Obstetric)] : no [Vaginal Wall Prolapse] : no [Rectal Prolapse] : no [Urge Incontinence Of Urine] : no [Unable To Restrain Bowel Movement] : mild [x1] : nocturia once nightly [Urinary Frequency] : no [Feelings Of Urinary Urgency] : no [Urinary Tract Infection] : moderate [Constipation Obstructed Defecation] : no [Pelvic Pain] : no [Vaginal Pain] : no [Vulvar Pain] : no [] : mild [FreeTextEntry1] : \par \par Lise is s/p midurethral sling with OAB symptoms, she did have urgency prior to sling with DO on UDS and now has UUI, rare MARBELLA if strong sneeze however able to lift heavy things, she has restarted PT with improvement in her symptoms, she did have 1 episode of PMB, with a negative pelvic sonogram.\par , she does not want to try medications, she has nocturia X 1,\par \par UDS no DO, MARBELLA at capacity\par cysto negative

## 2020-09-24 NOTE — DISCUSSION/SUMMARY
[FreeTextEntry1] : Lise is a 49yo s/p miduretheral sling with exacerbation of OAB symptoms s/p course of physical therapy here for follow-up of symptoms. Cysto negative, UDS with MARBELLA at capacity, no DO.  She does not want to try medications. We discussed additional treatment options including sacral neuromodulation, PTNS and intra detrusor Botox. She would like to start PTNS. she will return for PTNS. ALl questions were answered. \par

## 2020-09-24 NOTE — PHYSICAL EXAM
[Chaperone Present] : A chaperone was present in the examining room during all aspects of the physical examination [No Acute Distress] : in no acute distress [Well developed] : well developed [Well Nourished] : ~L well nourished [Oriented x3] : oriented to person, place, and time [Normal Memory] : ~T memory was ~L unimpaired [Normal Mood/Affect] : mood and affect are normal [Tenderness] : ~T no ~M abdominal tenderness observed [Distended] : not distended [Labia Majora] : were normal [Labia Minora] : were normal [Normal Appearance] : general appearance was normal [2] : 2 [Normal] : normal [Uterine Adnexae] : were not tender and not enlarged [FreeTextEntry4] : no mesh exposure , graft non tender

## 2020-10-09 ENCOUNTER — APPOINTMENT (OUTPATIENT)
Age: 50
End: 2020-10-09
Payer: MEDICAID

## 2020-10-09 PROCEDURE — 64566 NEUROELTRD STIM POST TIBIAL: CPT

## 2020-10-13 ENCOUNTER — APPOINTMENT (OUTPATIENT)
Dept: ORTHOPEDIC SURGERY | Facility: CLINIC | Age: 50
End: 2020-10-13
Payer: MEDICAID

## 2020-10-13 DIAGNOSIS — S62.346D NONDISPLACED FRACTURE OF BASE OF FIFTH METACARPAL BONE, RIGHT HAND, SUBSEQUENT ENCOUNTER FOR FRACTURE WITH ROUTINE HEALING: ICD-10-CM

## 2020-10-13 PROCEDURE — 99024 POSTOP FOLLOW-UP VISIT: CPT

## 2020-10-13 NOTE — PHYSICAL EXAM
[Normal Finger/nose] : finger to nose coordination [Normal] : no peripheral adenopathy appreciated [de-identified] : right hand:\par Skin intact no swelling no ecchymosis no erythema\par no gross deformity, normal finger cascade, no pain at the fracture site with palpation\par Range of motion 0f the fifth digit is fully intact with a tip to palm distance of 0 mm\par Neurovascular intact distally [de-identified] : SENAR

## 2020-10-13 NOTE — HISTORY OF PRESENT ILLNESS
[FreeTextEntry1] : 07/22/2020: 50-year-old female presents for evaluation of a right fifth metacarpal fracture. Approximately 2 weeks ago she struck her hand on a hard surface and developed pain at the base of the fifth metacarpal. She was recently seen at an urgent care where x-rays were positive for a minimally displaced fracture of the fifth metacarpal. She was given a splint and presents in followup. She has no pain at the fracture site that does not radiate it is worse with activity and improved with immobilization.\par \par 08/12/2020: Patient returns for repeat xrays and re-evaluation of her right hand.  he has tolerated her cast and has kept it clean dry and intact. She has no pain at the fracture site.\par \par 9/9/20: the patient returns today for followup of her right fifth metacarpal base fracture.She has been weaning from the splint and has improvement in her pain. She does have some stiffness of her digitsas well as activity-related pain at the fracture site that is within normal limits the\par \par 10/13/2020: The patient returns to the office for reevaluation of her right hand. She is now about 10 weeks removed from her initial injury. She has been working with hand therapy to improve her range of motion. She still has some discomfort in the fifth metacarpal from time to time but she describes it as achy in nature and activity related.

## 2020-10-13 NOTE — ASSESSMENT
[FreeTextEntry1] : Patient is healing well clinically and radiographically from a fifth metacarpal fracture of the right hand. She will continue to work with hand therapy to increase strengthening. She will follow back up in the office as needed. The patient is in agreement with this plan.

## 2020-10-16 ENCOUNTER — APPOINTMENT (OUTPATIENT)
Age: 50
End: 2020-10-16
Payer: MEDICAID

## 2020-10-16 DIAGNOSIS — R32 UNSPECIFIED URINARY INCONTINENCE: ICD-10-CM

## 2020-10-16 PROCEDURE — 64566 NEUROELTRD STIM POST TIBIAL: CPT

## 2020-10-23 ENCOUNTER — APPOINTMENT (OUTPATIENT)
Age: 50
End: 2020-10-23
Payer: MEDICAID

## 2020-10-23 PROCEDURE — 64566 NEUROELTRD STIM POST TIBIAL: CPT

## 2020-10-23 PROCEDURE — 99072 ADDL SUPL MATRL&STAF TM PHE: CPT

## 2020-10-30 ENCOUNTER — APPOINTMENT (OUTPATIENT)
Age: 50
End: 2020-10-30
Payer: MEDICAID

## 2020-10-30 PROCEDURE — 64566 NEUROELTRD STIM POST TIBIAL: CPT

## 2020-10-30 PROCEDURE — 99072 ADDL SUPL MATRL&STAF TM PHE: CPT

## 2020-11-06 ENCOUNTER — APPOINTMENT (OUTPATIENT)
Age: 50
End: 2020-11-06
Payer: MEDICAID

## 2020-11-06 DIAGNOSIS — N39.41 URGE INCONTINENCE: ICD-10-CM

## 2020-11-06 DIAGNOSIS — R39.15 URGENCY OF URINATION: ICD-10-CM

## 2020-11-06 DIAGNOSIS — R35.0 FREQUENCY OF MICTURITION: ICD-10-CM

## 2020-11-06 PROCEDURE — 64566 NEUROELTRD STIM POST TIBIAL: CPT

## 2020-11-06 PROCEDURE — 99072 ADDL SUPL MATRL&STAF TM PHE: CPT

## 2020-11-13 ENCOUNTER — APPOINTMENT (OUTPATIENT)
Age: 50
End: 2020-11-13
Payer: MEDICAID

## 2020-11-13 PROCEDURE — 99072 ADDL SUPL MATRL&STAF TM PHE: CPT

## 2020-11-13 PROCEDURE — 64566 NEUROELTRD STIM POST TIBIAL: CPT

## 2020-11-20 ENCOUNTER — APPOINTMENT (OUTPATIENT)
Age: 50
End: 2020-11-20
Payer: MEDICAID

## 2020-11-20 PROCEDURE — 64566 NEUROELTRD STIM POST TIBIAL: CPT

## 2020-11-25 ENCOUNTER — APPOINTMENT (OUTPATIENT)
Age: 50
End: 2020-11-25

## 2020-12-01 ENCOUNTER — APPOINTMENT (OUTPATIENT)
Dept: UROGYNECOLOGY | Facility: CLINIC | Age: 50
End: 2020-12-01

## 2020-12-04 ENCOUNTER — APPOINTMENT (OUTPATIENT)
Age: 50
End: 2020-12-04
Payer: MEDICAID

## 2020-12-04 PROCEDURE — 99072 ADDL SUPL MATRL&STAF TM PHE: CPT

## 2020-12-04 PROCEDURE — 64566 NEUROELTRD STIM POST TIBIAL: CPT

## 2020-12-07 ENCOUNTER — TRANSCRIPTION ENCOUNTER (OUTPATIENT)
Age: 50
End: 2020-12-07

## 2020-12-11 ENCOUNTER — APPOINTMENT (OUTPATIENT)
Age: 50
End: 2020-12-11
Payer: MEDICAID

## 2020-12-11 PROCEDURE — 99072 ADDL SUPL MATRL&STAF TM PHE: CPT

## 2020-12-11 PROCEDURE — 64566 NEUROELTRD STIM POST TIBIAL: CPT

## 2020-12-18 ENCOUNTER — APPOINTMENT (OUTPATIENT)
Age: 50
End: 2020-12-18

## 2020-12-23 ENCOUNTER — APPOINTMENT (OUTPATIENT)
Age: 50
End: 2020-12-23

## 2021-01-29 ENCOUNTER — TRANSCRIPTION ENCOUNTER (OUTPATIENT)
Age: 51
End: 2021-01-29

## 2021-02-02 ENCOUNTER — TRANSCRIPTION ENCOUNTER (OUTPATIENT)
Age: 51
End: 2021-02-02

## 2021-02-04 ENCOUNTER — APPOINTMENT (OUTPATIENT)
Dept: ORTHOPEDIC SURGERY | Facility: CLINIC | Age: 51
End: 2021-02-04

## 2021-10-13 ENCOUNTER — APPOINTMENT (OUTPATIENT)
Dept: ULTRASOUND IMAGING | Facility: CLINIC | Age: 51
End: 2021-10-13
Payer: MEDICAID

## 2021-10-13 ENCOUNTER — OUTPATIENT (OUTPATIENT)
Dept: OUTPATIENT SERVICES | Facility: HOSPITAL | Age: 51
LOS: 1 days | End: 2021-10-13
Payer: MEDICAID

## 2021-10-13 ENCOUNTER — APPOINTMENT (OUTPATIENT)
Dept: RADIOLOGY | Facility: CLINIC | Age: 51
End: 2021-10-13
Payer: MEDICAID

## 2021-10-13 ENCOUNTER — APPOINTMENT (OUTPATIENT)
Dept: MAMMOGRAPHY | Facility: CLINIC | Age: 51
End: 2021-10-13
Payer: MEDICAID

## 2021-10-13 ENCOUNTER — OUTPATIENT (OUTPATIENT)
Dept: OUTPATIENT SERVICES | Facility: HOSPITAL | Age: 51
LOS: 1 days | End: 2021-10-13

## 2021-10-13 DIAGNOSIS — M85.9 DISORDER OF BONE DENSITY AND STRUCTURE, UNSPECIFIED: ICD-10-CM

## 2021-10-13 DIAGNOSIS — Z00.8 ENCOUNTER FOR OTHER GENERAL EXAMINATION: ICD-10-CM

## 2021-10-13 DIAGNOSIS — R92.2 INCONCLUSIVE MAMMOGRAM: ICD-10-CM

## 2021-10-13 PROCEDURE — 77067 SCR MAMMO BI INCL CAD: CPT | Mod: 26

## 2021-10-13 PROCEDURE — 77063 BREAST TOMOSYNTHESIS BI: CPT | Mod: 26

## 2021-10-13 PROCEDURE — 77080 DXA BONE DENSITY AXIAL: CPT | Mod: 26

## 2021-10-13 PROCEDURE — 76641 ULTRASOUND BREAST COMPLETE: CPT | Mod: 26,50

## 2021-10-13 PROCEDURE — 77080 DXA BONE DENSITY AXIAL: CPT

## 2021-10-13 PROCEDURE — 76641 ULTRASOUND BREAST COMPLETE: CPT

## 2021-10-13 PROCEDURE — 77067 SCR MAMMO BI INCL CAD: CPT

## 2021-10-13 PROCEDURE — 77063 BREAST TOMOSYNTHESIS BI: CPT

## 2021-10-15 ENCOUNTER — OUTPATIENT (OUTPATIENT)
Dept: OUTPATIENT SERVICES | Facility: HOSPITAL | Age: 51
LOS: 1 days | End: 2021-10-15
Payer: MEDICAID

## 2021-10-15 ENCOUNTER — APPOINTMENT (OUTPATIENT)
Dept: MAMMOGRAPHY | Facility: CLINIC | Age: 51
End: 2021-10-15
Payer: MEDICAID

## 2021-10-15 DIAGNOSIS — Z00.8 ENCOUNTER FOR OTHER GENERAL EXAMINATION: ICD-10-CM

## 2021-10-15 PROCEDURE — 77065 DX MAMMO INCL CAD UNI: CPT

## 2021-10-15 PROCEDURE — 77065 DX MAMMO INCL CAD UNI: CPT | Mod: 26,RT

## 2021-10-20 ENCOUNTER — OUTPATIENT (OUTPATIENT)
Dept: OUTPATIENT SERVICES | Facility: HOSPITAL | Age: 51
LOS: 1 days | End: 2021-10-20
Payer: MEDICAID

## 2021-10-20 ENCOUNTER — APPOINTMENT (OUTPATIENT)
Dept: MAMMOGRAPHY | Facility: CLINIC | Age: 51
End: 2021-10-20
Payer: MEDICAID

## 2021-10-20 ENCOUNTER — RESULT REVIEW (OUTPATIENT)
Age: 51
End: 2021-10-20

## 2021-10-20 DIAGNOSIS — Z00.8 ENCOUNTER FOR OTHER GENERAL EXAMINATION: ICD-10-CM

## 2021-10-20 PROCEDURE — 77065 DX MAMMO INCL CAD UNI: CPT | Mod: 26,RT

## 2021-10-20 PROCEDURE — 19081 BX BREAST 1ST LESION STRTCTC: CPT | Mod: RT

## 2021-10-20 PROCEDURE — 19081 BX BREAST 1ST LESION STRTCTC: CPT

## 2021-10-20 PROCEDURE — 77065 DX MAMMO INCL CAD UNI: CPT

## 2021-10-20 PROCEDURE — A4648: CPT

## 2021-10-28 ENCOUNTER — TRANSCRIPTION ENCOUNTER (OUTPATIENT)
Age: 51
End: 2021-10-28

## 2022-01-18 ENCOUNTER — TRANSCRIPTION ENCOUNTER (OUTPATIENT)
Age: 52
End: 2022-01-18

## 2022-10-02 ENCOUNTER — NON-APPOINTMENT (OUTPATIENT)
Age: 52
End: 2022-10-02